# Patient Record
Sex: FEMALE | Race: WHITE
[De-identification: names, ages, dates, MRNs, and addresses within clinical notes are randomized per-mention and may not be internally consistent; named-entity substitution may affect disease eponyms.]

---

## 2019-04-01 ENCOUNTER — HOSPITAL ENCOUNTER (EMERGENCY)
Dept: HOSPITAL 26 - MED | Age: 59
Discharge: HOME | End: 2019-04-01
Payer: COMMERCIAL

## 2019-04-01 VITALS — DIASTOLIC BLOOD PRESSURE: 87 MMHG | SYSTOLIC BLOOD PRESSURE: 155 MMHG

## 2019-04-01 VITALS — DIASTOLIC BLOOD PRESSURE: 84 MMHG | SYSTOLIC BLOOD PRESSURE: 145 MMHG

## 2019-04-01 VITALS — WEIGHT: 211 LBS | HEIGHT: 62 IN | BODY MASS INDEX: 38.83 KG/M2

## 2019-04-01 DIAGNOSIS — S01.01XA: Primary | ICD-10-CM
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
## 2019-04-01 PROCEDURE — 90471 IMMUNIZATION ADMIN: CPT

## 2019-04-01 PROCEDURE — 12002 RPR S/N/AX/GEN/TRNK2.6-7.5CM: CPT

## 2019-04-01 PROCEDURE — 72125 CT NECK SPINE W/O DYE: CPT

## 2019-04-01 PROCEDURE — 99284 EMERGENCY DEPT VISIT MOD MDM: CPT

## 2019-04-01 PROCEDURE — 70450 CT HEAD/BRAIN W/O DYE: CPT

## 2019-04-01 PROCEDURE — 90715 TDAP VACCINE 7 YRS/> IM: CPT

## 2019-04-01 NOTE — NUR
Patient discharged with v/s stable. Written and verbal after care instructions 
given and explained. 

Patient alert, oriented and verbalized understanding of instructions. 
Ambulatory with steady gait. All questions addressed prior to discharge. ID 
band removed. Patient advised to follow up with PMD. Rx of KEFLEX, MOTRIN 
given. Patient educated on indication of medication including possible reaction 
and side effects. Opportunity to ask questions provided and answered.

## 2019-04-01 NOTE — NUR
58 Y F BIB SELF FROM WORK WITH C/O LT POSTERIOR SIDED HEAD 1 INCH OPEN 
LACERATION S/P WAS SITTING ON THE CHAIR WHEN SHE TRIP AND HIT HER HEAD ON A 
WOOD BOOK CASE AT 1110 THIS MORNING. +NAUSEA, -VOMITING. VSS AT THIS TIME. 
AA0X4. SPEECH IS CLEAR. BED IS DOWN, LOCKED, BED RAIL X 1, ERMD NOTIFIED.



DENIES LOC OR VOMITING



HX; DENIES

RX; DENIES

## 2019-04-16 ENCOUNTER — HOSPITAL ENCOUNTER (EMERGENCY)
Dept: HOSPITAL 26 - MED | Age: 59
Discharge: HOME | End: 2019-04-16
Payer: COMMERCIAL

## 2019-04-16 VITALS — SYSTOLIC BLOOD PRESSURE: 139 MMHG | DIASTOLIC BLOOD PRESSURE: 87 MMHG

## 2019-04-16 VITALS — SYSTOLIC BLOOD PRESSURE: 122 MMHG | DIASTOLIC BLOOD PRESSURE: 80 MMHG

## 2019-04-16 VITALS — WEIGHT: 214 LBS | HEIGHT: 66 IN | BODY MASS INDEX: 34.39 KG/M2

## 2019-04-16 DIAGNOSIS — S01.01XD: Primary | ICD-10-CM
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
## 2019-04-16 NOTE — NUR
Patient noted to have existing wounds upon arrival to ER. 7 WELL HEALED STABLES 
REMOVED FROM HEAD. PT YON WELL Wound covered with dressing.  Physician 
informed.

## 2019-04-16 NOTE — NUR
58 y female c/o head injury 04/01/2019 fell from chair at work--hit book case. 
repaired with 7 staples. was sent home with rx keflex , motrin, neosporin. tdap 
up to date. pt states staples are tender to touch 2/10 throbbing pain. 
-redness, -swelling. wound check. aa0x4. vss at this time. bed is down, locked, 
bed rail x 1, ermd notified.



hx--denies

rx--none

## 2019-04-16 NOTE — NUR
Patient discharged with v/s stable. Written and verbal after care instructions 
given and explained. 

Patient alert, oriented and verbalized understanding of instructions. 
Ambulatory with steady gait. All questions addressed prior to discharge. ID 
band removed. Patient advised to follow up with PMD.NO Rx given. Patient 
educated on indication of medication including possible reaction and side 
effects. Opportunity to ask questions provided and answered.


-------------------------------------------------------------------------------

Addendum: 04/16/19 at 1211 by [email or web address]

-------------------------------------------------------------------------------

PT LEFT DISCHARGE INFO/PAPPER WORK AT BEDSIDE

## 2019-10-21 ENCOUNTER — HOSPITAL ENCOUNTER (EMERGENCY)
Dept: HOSPITAL 26 - MED | Age: 59
Discharge: HOME | End: 2019-10-21
Payer: COMMERCIAL

## 2019-10-21 VITALS — HEIGHT: 63 IN | WEIGHT: 176 LBS | BODY MASS INDEX: 31.18 KG/M2

## 2019-10-21 VITALS — DIASTOLIC BLOOD PRESSURE: 82 MMHG | SYSTOLIC BLOOD PRESSURE: 158 MMHG
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
## 2019-10-21 DIAGNOSIS — I10: ICD-10-CM
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
## 2019-10-21 DIAGNOSIS — S93.501A: Primary | ICD-10-CM
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
## 2019-10-21 PROCEDURE — 73660 X-RAY EXAM OF TOE(S): CPT

## 2019-10-21 PROCEDURE — 99283 EMERGENCY DEPT VISIT LOW MDM: CPT

## 2019-10-21 NOTE — NUR
Patient discharged with v/s stable. Written and verbal after care instructions 
given and explained. 

Patient alert, oriented and verbalized understanding of instructions. 
Ambulatory with steady gait. All questions addressed prior to discharge. ID 
band removed. Patient advised to follow up with PMD. Rx of MTORIN given. 
Patient educated on indication of medication including possible reaction and 
side effects. Opportunity to ask questions provided and answered.
